# Patient Record
Sex: FEMALE | Race: BLACK OR AFRICAN AMERICAN | Employment: FULL TIME | URBAN - METROPOLITAN AREA
[De-identification: names, ages, dates, MRNs, and addresses within clinical notes are randomized per-mention and may not be internally consistent; named-entity substitution may affect disease eponyms.]

---

## 2019-03-31 ENCOUNTER — HOSPITAL ENCOUNTER (EMERGENCY)
Age: 29
Discharge: HOME | End: 2019-03-31
Attending: EMERGENCY MEDICINE
Payer: MEDICAID

## 2019-03-31 VITALS
RESPIRATION RATE: 14 BRPM | SYSTOLIC BLOOD PRESSURE: 124 MMHG | OXYGEN SATURATION: 100 % | WEIGHT: 293 LBS | HEART RATE: 81 BPM | DIASTOLIC BLOOD PRESSURE: 81 MMHG | HEIGHT: 67 IN | BODY MASS INDEX: 45.99 KG/M2 | TEMPERATURE: 97.6 F

## 2019-03-31 DIAGNOSIS — J02.0 STREP PHARYNGITIS: Primary | ICD-10-CM

## 2019-03-31 LAB — S PYO AG THROAT QL: POSITIVE

## 2019-03-31 PROCEDURE — 87880 STREP A ASSAY W/OPTIC: CPT

## 2019-03-31 PROCEDURE — 96372 THER/PROPH/DIAG INJ SC/IM: CPT

## 2019-03-31 PROCEDURE — 6360000002 HC RX W HCPCS: Performed by: EMERGENCY MEDICINE

## 2019-03-31 PROCEDURE — 99283 EMERGENCY DEPT VISIT LOW MDM: CPT

## 2019-03-31 RX ADMIN — PENICILLIN G BENZATHINE 1.2 MILLION UNITS: 1200000 INJECTION, SUSPENSION INTRAMUSCULAR at 10:50

## 2019-03-31 ASSESSMENT — PAIN DESCRIPTION - PAIN TYPE
TYPE: ACUTE PAIN

## 2019-03-31 ASSESSMENT — PAIN DESCRIPTION - DESCRIPTORS
DESCRIPTORS: ACHING

## 2019-03-31 ASSESSMENT — PAIN DESCRIPTION - LOCATION
LOCATION: THROAT
LOCATION: THROAT;EAR
LOCATION: THROAT;EAR

## 2019-03-31 ASSESSMENT — PAIN SCALES - GENERAL
PAINLEVEL_OUTOF10: 7

## 2019-03-31 ASSESSMENT — PAIN DESCRIPTION - ORIENTATION: ORIENTATION: LEFT;RIGHT

## 2019-03-31 NOTE — LETTER
[address] Emergency Department  [address] [address]  Phone: [phone]  Fax: [phone]               March 31, 2019    Patient: Carlos Paez   YOB: 1990   Date of Visit: 3/31/2019       To Whom It May Concern:    Adis Brito was seen and treated in our emergency department on 3/31/2019. She may return to work 4/2/2019.       Sincerely,               Signature:__________________________________

## 2019-03-31 NOTE — ED NOTES
Patient to ed with complaints of a sore throat and ear pain with started approx 1 week ago and continued.      Zbigniew Cisneros RN  03/31/19 1798

## 2019-03-31 NOTE — ED PROVIDER NOTES
CHIEF COMPLAINT  Pharyngitis      HISTORY OF PRESENT ILLNESS  Carlie Dumont is a 33 y.o. female, who presents to the ED with proximally one week history of upper respiratory symptoms with nasal congestion and right-sided earache right sided sore throat and right-sided neck pain moderate in severity patient has pain worse with swallowing no difficulty swallowing no shortness of breath no fever. Review of Systems    I have reviewed the following from the nursing documentation. History reviewed. No pertinent past medical history. History reviewed. No pertinent surgical history. History reviewed. No pertinent family history.   Social History     Socioeconomic History    Marital status:      Spouse name: Not on file    Number of children: Not on file    Years of education: Not on file    Highest education level: Not on file   Occupational History    Not on file   Social Needs    Financial resource strain: Not on file    Food insecurity:     Worry: Not on file     Inability: Not on file    Transportation needs:     Medical: Not on file     Non-medical: Not on file   Tobacco Use    Smoking status: Never Smoker    Smokeless tobacco: Never Used   Substance and Sexual Activity    Alcohol use: Not Currently    Drug use: Never    Sexual activity: Not on file   Lifestyle    Physical activity:     Days per week: Not on file     Minutes per session: Not on file    Stress: Not on file   Relationships    Social connections:     Talks on phone: Not on file     Gets together: Not on file     Attends [employer] service: Not on file     Active member of club or organization: Not on file     Attends meetings of clubs or organizations: Not on file     Relationship status: Not on file    Intimate partner violence:     Fear of current or ex partner: Not on file     Emotionally abused: Not on file     Physically abused: Not on file     Forced sexual activity: Not on file   Other Topics Concern    Not on file   Social History Narrative    Not on file     No current facility-administered medications for this encounter. No current outpatient medications on file. Allergies   Allergen Reactions    Fentanyl           PHYSICAL EXAM  /81   Pulse 81   Temp 97.6 °F (36.4 °C)   Resp 14   Ht 5' 7\" (1.702 m)   Wt (!) 201.1 kg (443 lb 7 oz)   LMP 03/31/2019   SpO2 100%   BMI 69.45 kg/m²   Physical Exam   GENERAL APPEARANCE: Awake and alert. Cooperative. In no acute distress. EYES: PERRL. Corneas clear. Sclera non icteric. No conjunctival injection  ENT: Oropharynx bilateral tonsillar hypertrophy and erythema no exudates Airway patent. No stridor. No asymmetry. No uvular deviation no palatal depression. Tympanic membranes are clear bilaterally  NECK: Supple mild right sided anterior cervical adenopathy  LUNGS: Clear. Equal breath sounds bilaterally. CARDIOVASCULAR: RRR. No murmurs rubs or gallops. EXTREMITIES:  Moves all extremities equally. SKIN: Warm and dry. LABORATORY STUDIES:   Labs Reviewed   STREP SCREEN GROUP A THROAT - Abnormal; Notable for the following components:       Result Value    Rapid Strep A Screen POSITIVE (*)     All other components within normal limits    Narrative:     Performed at:  [facility]  [address],  [address]   Phone ([phone]  No orders to display       If EKG done, EKG was interpreted independently by me    PROCEDURES  Procedures    EDCOURSE/MDM  Patient seen and evaluated. Old records selectively reviewed if pertinent. Labs and imaging reviewed and results discussed with patient. I considered strep pharyngitis. No evidence for peritonsillar abscess at this time. The patient prefers treatment with intramuscular injection of penicillin at this time.     If Kane Valentino is discharged, I discussed with Kane Valentino and/or family the exam results, diagnosis, care, prognosis, reasons to return and the importance of follow up. Patient and/or family is in agreement with plan and all questions have been answered. Specific discharge instructions explained, including reasons to return to the emergency department. If discharged, patient was given scripts for the following medications. New Prescriptions    No medications on file       CLINICAL IMPRESSION  1. Strep pharyngitis        /81   Pulse 81   Temp 97.6 °F (36.4 °C)   Resp 14   Ht 5' 7\" (1.702 m)   Wt (!) 201.1 kg (443 lb 7 oz)   LMP 03/31/2019   SpO2 100%   BMI 69.45 kg/m²     DISPOSITION  Travis Oliveira was discharged in stable condition.                    Lencho Dowd MD  03/31/19 6053

## 2019-03-31 NOTE — ED NOTES
Patient given work note, discharge instructions verbal and written, patient verbalized understanding. Alert/oriented X4, Clear speech.   Patient exhibits no distress, ambulates with steady gait per self leaving unit, no further request.     Penny Musa RN  03/31/19 5412

## 2020-08-25 ENCOUNTER — HOSPITAL ENCOUNTER (EMERGENCY)
Age: 30
Discharge: HOME | End: 2020-08-25
Attending: EMERGENCY MEDICINE
Payer: MEDICAID

## 2020-08-25 ENCOUNTER — APPOINTMENT (OUTPATIENT)
Dept: CT IMAGING | Age: 30
End: 2020-08-25
Payer: MEDICAID

## 2020-08-25 VITALS
HEIGHT: 67 IN | SYSTOLIC BLOOD PRESSURE: 155 MMHG | HEART RATE: 76 BPM | OXYGEN SATURATION: 99 % | DIASTOLIC BLOOD PRESSURE: 103 MMHG | WEIGHT: 293 LBS | BODY MASS INDEX: 45.99 KG/M2 | RESPIRATION RATE: 20 BRPM | TEMPERATURE: 98.8 F

## 2020-08-25 LAB
A/G RATIO: 0.9 (ref 1.1–2.2)
ALBUMIN SERPL-MCNC: 3.6 G/DL (ref 3.4–5)
ALP BLD-CCNC: 72 U/L (ref 40–129)
ALT SERPL-CCNC: 13 U/L (ref 10–40)
ANION GAP SERPL CALCULATED.3IONS-SCNC: 11 MMOL/L (ref 3–16)
ANISOCYTOSIS: ABNORMAL
AST SERPL-CCNC: 9 U/L (ref 15–37)
BACTERIA: ABNORMAL /HPF
BASOPHILS ABSOLUTE: 0.1 K/UL (ref 0–0.2)
BASOPHILS RELATIVE PERCENT: 0.9 %
BILIRUB SERPL-MCNC: <0.2 MG/DL (ref 0–1)
BILIRUBIN URINE: NEGATIVE
BLOOD, URINE: ABNORMAL
BUN BLDV-MCNC: 8 MG/DL (ref 7–20)
CALCIUM SERPL-MCNC: 9.1 MG/DL (ref 8.3–10.6)
CHLORIDE BLD-SCNC: 103 MMOL/L (ref 99–110)
CLARITY: CLEAR
CO2: 27 MMOL/L (ref 21–32)
COLOR: YELLOW
CREAT SERPL-MCNC: <0.5 MG/DL (ref 0.6–1.1)
EOSINOPHILS ABSOLUTE: 0 K/UL (ref 0–0.6)
EOSINOPHILS RELATIVE PERCENT: 0.7 %
EPITHELIAL CELLS, UA: ABNORMAL /HPF (ref 0–5)
GFR AFRICAN AMERICAN: >60
GFR NON-AFRICAN AMERICAN: >60
GLOBULIN: 3.8 G/DL
GLUCOSE BLD-MCNC: 140 MG/DL (ref 70–99)
GLUCOSE URINE: NEGATIVE MG/DL
HCG(URINE) PREGNANCY TEST: NEGATIVE
HCT VFR BLD CALC: 29.5 % (ref 36–48)
HEMATOLOGY PATH CONSULT: NORMAL
HEMATOLOGY PATH CONSULT: YES
HEMOGLOBIN: 8.5 G/DL (ref 12–16)
HYPOCHROMIA: ABNORMAL
KETONES, URINE: NEGATIVE MG/DL
LEUKOCYTE ESTERASE, URINE: NEGATIVE
LYMPHOCYTES ABSOLUTE: 2.1 K/UL (ref 1–5.1)
LYMPHOCYTES RELATIVE PERCENT: 30.5 %
MCH RBC QN AUTO: 18.6 PG (ref 26–34)
MCHC RBC AUTO-ENTMCNC: 28.8 G/DL (ref 31–36)
MCV RBC AUTO: 64.7 FL (ref 80–100)
MICROCYTES: ABNORMAL
MICROSCOPIC EXAMINATION: YES
MONOCYTES ABSOLUTE: 0.4 K/UL (ref 0–1.3)
MONOCYTES RELATIVE PERCENT: 6.4 %
NEUTROPHILS ABSOLUTE: 4.2 K/UL (ref 1.7–7.7)
NEUTROPHILS RELATIVE PERCENT: 61.5 %
NITRITE, URINE: NEGATIVE
PDW BLD-RTO: 20.6 % (ref 12.4–15.4)
PH UA: 6 (ref 5–8)
PLATELET # BLD: 448 K/UL (ref 135–450)
PMV BLD AUTO: 9.2 FL (ref 5–10.5)
POLYCHROMASIA: ABNORMAL
POTASSIUM REFLEX MAGNESIUM: 4.4 MMOL/L (ref 3.5–5.1)
PROTEIN UA: NEGATIVE MG/DL
RBC # BLD: 4.56 M/UL (ref 4–5.2)
RBC UA: ABNORMAL /HPF (ref 0–4)
SODIUM BLD-SCNC: 141 MMOL/L (ref 136–145)
SPECIFIC GRAVITY UA: 1.02 (ref 1–1.03)
TOTAL PROTEIN: 7.4 G/DL (ref 6.4–8.2)
URINE TYPE: ABNORMAL
UROBILINOGEN, URINE: 0.2 E.U./DL
WBC # BLD: 6.8 K/UL (ref 4–11)
WBC UA: ABNORMAL /HPF (ref 0–5)

## 2020-08-25 PROCEDURE — 36415 COLL VENOUS BLD VENIPUNCTURE: CPT

## 2020-08-25 PROCEDURE — 96374 THER/PROPH/DIAG INJ IV PUSH: CPT

## 2020-08-25 PROCEDURE — 84703 CHORIONIC GONADOTROPIN ASSAY: CPT

## 2020-08-25 PROCEDURE — 80053 COMPREHEN METABOLIC PANEL: CPT

## 2020-08-25 PROCEDURE — 6360000002 HC RX W HCPCS: Performed by: EMERGENCY MEDICINE

## 2020-08-25 PROCEDURE — 81001 URINALYSIS AUTO W/SCOPE: CPT

## 2020-08-25 PROCEDURE — 85025 COMPLETE CBC W/AUTO DIFF WBC: CPT

## 2020-08-25 PROCEDURE — 99284 EMERGENCY DEPT VISIT MOD MDM: CPT

## 2020-08-25 PROCEDURE — 74176 CT ABD & PELVIS W/O CONTRAST: CPT

## 2020-08-25 PROCEDURE — 2580000003 HC RX 258: Performed by: EMERGENCY MEDICINE

## 2020-08-25 RX ORDER — IBUPROFEN 600 MG/1
600 TABLET ORAL EVERY 6 HOURS PRN
Qty: 40 TABLET | Refills: 0 | Status: SHIPPED | OUTPATIENT
Start: 2020-08-25 | End: 2021-02-28 | Stop reason: ALTCHOICE

## 2020-08-25 RX ORDER — 0.9 % SODIUM CHLORIDE 0.9 %
1000 INTRAVENOUS SOLUTION INTRAVENOUS ONCE
Status: COMPLETED | OUTPATIENT
Start: 2020-08-25 | End: 2020-08-25

## 2020-08-25 RX ORDER — KETOROLAC TROMETHAMINE 30 MG/ML
30 INJECTION, SOLUTION INTRAMUSCULAR; INTRAVENOUS ONCE
Status: COMPLETED | OUTPATIENT
Start: 2020-08-25 | End: 2020-08-25

## 2020-08-25 RX ADMIN — KETOROLAC TROMETHAMINE 30 MG: 30 INJECTION, SOLUTION INTRAMUSCULAR at 10:00

## 2020-08-25 RX ADMIN — SODIUM CHLORIDE 1000 ML: 9 INJECTION, SOLUTION INTRAVENOUS at 09:59

## 2020-08-25 ASSESSMENT — PAIN DESCRIPTION - ORIENTATION: ORIENTATION: LOWER

## 2020-08-25 ASSESSMENT — PAIN DESCRIPTION - DESCRIPTORS: DESCRIPTORS: CRAMPING

## 2020-08-25 ASSESSMENT — PAIN SCALES - GENERAL: PAINLEVEL_OUTOF10: 8

## 2020-08-25 ASSESSMENT — PAIN DESCRIPTION - PAIN TYPE: TYPE: ACUTE PAIN

## 2020-08-25 ASSESSMENT — PAIN DESCRIPTION - LOCATION: LOCATION: ABDOMEN

## 2020-08-25 NOTE — ED NOTES
Patient given prescription,  discharge instructions verbal and written, patient verbalized understanding. Alert/oriented X4, Clear speech.   Patient exhibits no distress, ambulates with steady gait per self leaving unit, no further request.     Ward Mitchell RN  08/25/20 0513

## 2020-08-25 NOTE — ED PROVIDER NOTES
CHIEF COMPLAINT  Abdominal Pain (lower abd pain)      HISTORY OF PRESENT ILLNESS  Urban Dean is a 31 y.o. female with a history of reported ectopic pregnancy status post tubal ligation who presents to the ED complaining of abdominal pain. Patient states that over the last 2 days she has noted some lower abdominal cramping that she rates as an 8/10. Patient denies any fevers, chills, or sweats. Patient reports that this may reflect her normal menses but states that she has irregular. Patient denies fevers, chills, or sweats. No cough, congestion. No nausea, vomiting, or diarrhea. No urinary symptoms. No bowel complaints. Patient reports that her last period was only a few weeks ago and was within normal limits. .   No other complaints, modifying factors or associated symptoms. I have reviewed the following from the nursing documentation. History reviewed. No pertinent past medical history. Past Surgical History:   Procedure Laterality Date    TUBAL LIGATION       History reviewed. No pertinent family history.   Social History     Socioeconomic History    Marital status:      Spouse name: Not on file    Number of children: Not on file    Years of education: Not on file    Highest education level: Not on file   Occupational History    Not on file   Social Needs    Financial resource strain: Not on file    Food insecurity     Worry: Not on file     Inability: Not on file    Transportation needs     Medical: Not on file     Non-medical: Not on file   Tobacco Use    Smoking status: Never Smoker    Smokeless tobacco: Never Used   Substance and Sexual Activity    Alcohol use: Not Currently    Drug use: Never    Sexual activity: Not on file   Lifestyle    Physical activity     Days per week: Not on file     Minutes per session: Not on file    Stress: Not on file   Relationships    Social connections     Talks on phone: Not on file     Gets together: Not on file     Attends Reflex to MG   Result Value Ref Range    Sodium 141 136 - 145 mmol/L    Potassium reflex Magnesium 4.4 3.5 - 5.1 mmol/L    Chloride 103 99 - 110 mmol/L    CO2 27 21 - 32 mmol/L    Anion Gap 11 3 - 16    Glucose 140 (H) 70 - 99 mg/dL    BUN 8 7 - 20 mg/dL    CREATININE <0.5 (L) 0.6 - 1.1 mg/dL    GFR Non-African American >60 >60    GFR African American >60 >60    Calcium 9.1 8.3 - 10.6 mg/dL    Total Protein 7.4 6.4 - 8.2 g/dL    Alb 3.6 3.4 - 5.0 g/dL    Albumin/Globulin Ratio 0.9 (L) 1.1 - 2.2    Total Bilirubin <0.2 0.0 - 1.0 mg/dL    Alkaline Phosphatase 72 40 - 129 U/L    ALT 13 10 - 40 U/L    AST 9 (L) 15 - 37 U/L    Globulin 3.8 g/dL           RADIOLOGY  X-RAYS:  I have reviewed radiologic plain film image(s). ALL OTHER NON-PLAIN FILM IMAGES SUCH AS CT, ULTRASOUND AND MRI HAVE BEEN READ BY THE RADIOLOGIST. CT ABDOMEN PELVIS WO CONTRAST Additional Contrast? None   Final Result      No acute abdominopelvic abnormality. Rechecks: Physical assessment performed. 1023: Patient denies any pain whatsoever at this time. ED COURSE/MDM  Patient seen and evaluated. Old records reviewed. Labs and imaging reviewed and results discussed with patient. Patient was given Toradol in the ED with good symptomatic relief. Patient was reassessed as noted above . No acute pathology was noted and pt is safe for discharge home to follow up with PCP. Patient's labs and imaging appear stable. As patient was having pelvic pain, I discussed and recommended obtaining a pelvic ultrasound for any additional pain. Patient reports that she understands and does not wish to proceed with transfer/formal ultrasound at this time. . Plan of care discussed with patient and family. Patient and family in agreement with plan. Patient was given scripts for the following medications. I counseled patient how to take these medications.    New Prescriptions    IBUPROFEN (IBU) 600 MG TABLET    Take 1 tablet by mouth every 6 hours as needed for Pain       CLINICAL IMPRESSION  1. Pelvic pain in female    2. Microcytic anemia    3. Elevated glucose        Blood pressure (!) 155/103, pulse 76, temperature 98.8 °F (37.1 °C), temperature source Oral, resp. rate 20, height 5' 7\" (1.702 m), weight (!) 483 lb 4.8 oz (219.2 kg), SpO2 99 %. Clari Jenkins was discharged to home in stable condition.         Robyn Arreola DO  08/25/20 1041

## 2020-08-25 NOTE — ED TRIAGE NOTES
Patient to ed with complaints of lower abd pain which started 2 days ago which patient describes as a cramping feeling. Patient denies illness, no vomiting, diarrhea or constipation, patient unsure if she is starting a menstral cycle d/t her cycles are irregular.

## 2021-02-28 ENCOUNTER — HOSPITAL ENCOUNTER (EMERGENCY)
Age: 31
Discharge: HOME | End: 2021-02-28
Attending: EMERGENCY MEDICINE
Payer: MEDICAID

## 2021-02-28 VITALS
TEMPERATURE: 98.6 F | DIASTOLIC BLOOD PRESSURE: 78 MMHG | OXYGEN SATURATION: 98 % | HEART RATE: 86 BPM | BODY MASS INDEX: 72.38 KG/M2 | RESPIRATION RATE: 18 BRPM | WEIGHT: 293 LBS | SYSTOLIC BLOOD PRESSURE: 157 MMHG

## 2021-02-28 DIAGNOSIS — J06.9 ACUTE UPPER RESPIRATORY INFECTION: Primary | ICD-10-CM

## 2021-02-28 DIAGNOSIS — R10.9 ABDOMINAL PAIN, UNSPECIFIED ABDOMINAL LOCATION: ICD-10-CM

## 2021-02-28 LAB
BACTERIA: ABNORMAL /HPF
BILIRUBIN URINE: NEGATIVE
BLOOD, URINE: ABNORMAL
CLARITY: CLEAR
COLOR: YELLOW
EPITHELIAL CELLS, UA: ABNORMAL /HPF (ref 0–5)
GLUCOSE URINE: NEGATIVE MG/DL
HCG(URINE) PREGNANCY TEST: NEGATIVE
KETONES, URINE: NEGATIVE MG/DL
LEUKOCYTE ESTERASE, URINE: NEGATIVE
MICROSCOPIC EXAMINATION: YES
MUCUS: ABNORMAL /LPF
NITRITE, URINE: NEGATIVE
PH UA: 6 (ref 5–8)
PROTEIN UA: NEGATIVE MG/DL
RBC UA: ABNORMAL /HPF (ref 0–4)
SPECIFIC GRAVITY UA: 1.02 (ref 1–1.03)
URINE REFLEX TO CULTURE: ABNORMAL
URINE TYPE: ABNORMAL
UROBILINOGEN, URINE: 0.2 E.U./DL
WBC UA: ABNORMAL /HPF (ref 0–5)

## 2021-02-28 PROCEDURE — 81001 URINALYSIS AUTO W/SCOPE: CPT

## 2021-02-28 PROCEDURE — 84703 CHORIONIC GONADOTROPIN ASSAY: CPT

## 2021-02-28 PROCEDURE — 99283 EMERGENCY DEPT VISIT LOW MDM: CPT

## 2021-02-28 RX ORDER — FLUTICASONE PROPIONATE 50 MCG
1 SPRAY, SUSPENSION (ML) NASAL DAILY
Qty: 1 BOTTLE | Refills: 0 | Status: SHIPPED | OUTPATIENT
Start: 2021-02-28 | End: 2021-11-25 | Stop reason: ALTCHOICE

## 2021-02-28 RX ORDER — AMOXICILLIN 875 MG/1
875 TABLET, COATED ORAL 2 TIMES DAILY
Qty: 14 TABLET | Refills: 0 | Status: SHIPPED | OUTPATIENT
Start: 2021-02-28 | End: 2021-03-07

## 2021-02-28 RX ORDER — DICYCLOMINE HCL 20 MG
20 TABLET ORAL EVERY 6 HOURS PRN
Qty: 12 TABLET | Refills: 0 | Status: SHIPPED | OUTPATIENT
Start: 2021-02-28 | End: 2021-11-25 | Stop reason: ALTCHOICE

## 2021-02-28 NOTE — ED PROVIDER NOTES
[facility]  EMERGENCY DEPT VISIT      Patient Identification  Estella Stallworth is a 31 y.o. female. Chief Complaint   Abdominal Pain (Pt states that she is having lower abd pain that is a ache that comes and goes although she is pain free now) and URI (pt states that she has had respiratory issues since weather change and no has no sense of smell although can taste everything)      History of Present Illness: This is a  31 y.o. female who presents ambulatory  to the ED with complaints of 4 to 5-day history of off-and-on lower abdominal pain which goes all the way across the lower abdomen. It comes and goes last for 10 to 30 minutes at a time. She has no associated fever. She has had a little bit of nausea but no vomiting. No diarrhea. She has urinary frequency but no dysuria. She had a little bit of clear vaginal discharge but no odor. Her last menstrual cycle was normal.  She does not believe herself to be pregnant. She has no history of ovarian cyst.  Her abdominal pain goes up to a 5 at times but currently it is essentially gone. Her appetite is been unchanged. Patient is also complaining of a 1 week history of upper respiratory symptoms primarily sinus congestion with posterior nasal drip and pressure over her cheeks and nose. She has had a cough with mostly clear to somewhat cream-colored sputum. No fever. No headache or body aches. No chest pain or shortness of breath. She states that she gets similar symptoms whenever the weather changes. He was been taking Benadryl with some improvement. She has no history of asthma. No known contact with covid. Feels like her sense of smell is decreased but she can taste. History reviewed. No pertinent past medical history. Past Surgical History:   Procedure Laterality Date    TUBAL LIGATION         No current facility-administered medications for this encounter.      Current Outpatient Medications:     fluticasone (FLONASE) 50 MCG/ACT nasal spray, 1 spray by Each Nostril route daily, Disp: 1 Bottle, Rfl: 0    dicyclomine (BENTYL) 20 MG tablet, Take 1 tablet by mouth every 6 hours as needed (abdominal pain), Disp: 12 tablet, Rfl: 0    amoxicillin (AMOXIL) 875 MG tablet, Take 1 tablet by mouth 2 times daily for 7 days, Disp: 14 tablet, Rfl: 0    Allergies   Allergen Reactions    Fentanyl        Social History     Socioeconomic History    Marital status:      Spouse name: Not on file    Number of children: Not on file    Years of education: Not on file    Highest education level: Not on file   Occupational History    Not on file   Social Needs    Financial resource strain: Not on file    Food insecurity     Worry: Not on file     Inability: Not on file    Transportation needs     Medical: Not on file     Non-medical: Not on file   Tobacco Use    Smoking status: Never Smoker    Smokeless tobacco: Never Used   Substance and Sexual Activity    Alcohol use: Not Currently    Drug use: Never    Sexual activity: Not on file   Lifestyle    Physical activity     Days per week: Not on file     Minutes per session: Not on file    Stress: Not on file   Relationships    Social connections     Talks on phone: Not on file     Gets together: Not on file     Attends [employer] service: Not on file     Active member of club or organization: Not on file     Attends meetings of clubs or organizations: Not on file     Relationship status: Not on file    Intimate partner violence     Fear of current or ex partner: Not on file     Emotionally abused: Not on file     Physically abused: Not on file     Forced sexual activity: Not on file   Other Topics Concern    Not on file   Social History Narrative    Not on file       Nursing Notes Reviewed      ROS:  GENERAL:  No fever, no chills, no diaphoresis, no appetite changes  EYES: no eye discharge, no eye redness, no visual changes  ENT: + nasal congestion, no sore throat  CARDIAC: no chest pain, no palpitations, no leg swelling  PULM: + cough, no shortness of breath  ABD: + abdominal pain, + nausea, no vomiting, no diarrhea  : no dysuria, no hematuria, no urgency, no frequency. No flank pain  MUSCULOSKELETAL: no back pain, no arthralgias, no myalgias  NEURO: no headache, no lightheadedness, no dizziness, no numbness, no weakness, no syncope  SKIN: no rashes, no erythema, no wounds, no ecchymosis      PHYSICAL EXAM:  GENERAL APPEARANCE: Dori Fam is in no acute respiratory distress. Awake and alert. VITAL SIGNS:   ED Triage Vitals [02/28/21 1507]   Enc Vitals Group      BP (!) 157/78      Pulse 86      Resp 18      Temp 98.6 °F (37 °C)      Temp Source Oral      SpO2 98 %      Weight (!) 462 lb 1.6 oz (209.6 kg)      Height       Head Circumference       Peak Flow       Pain Score       Pain Loc       Pain Edu? Excl. in [address]? HEAD: Normocephalic, atraumatic. EYES:  Extraocular muscles are intact. Pupils equal round and reactive to light. Conjunctivas are pink. Negative scleral icterus. ENT:  Mucous membranes are moist.  Pharynx without erythema or exudates. TMs with no erythema  NECK: Nontender and supple. No cervical adenopathy. CHEST:  Clear to auscultation bilaterally. No rales, rhonchi, or wheezing. HEART:  Regular rate and regular rhythm. No murmurs. Strong and equal pulses in the upper and lower extremities. ABDOMEN: Soft,  nondistended, positive bowel sounds. abdomen is nontender. No rebound. no guarding. MUSCULOSKELETAL: The calves are nontender to palpation. Active range of motion of the upper and lower extremities. No edema. NEUROLOGICAL: Awake, alert and oriented x 3. Power intact in the upper and lower extremities. Sensation is intact to light touch in the upper and lower extremities. Cranial Nerves 2-12 are intact. DERMATOLOGIC: No petechiae, rashes, or ecchymoses. No erythema. PSYCH: normal mood and affect. Normal thought content.       ED COURSE AND MEDICAL DECISION MAKING:      Radiology:  Films have been read by radiologist as noted in chart unless otherwise stated. Other radiologic studies (i.e. CT, MRI, ultrasounds, etc ) have been interpreted by radiologist.     No orders to display       Labs:  Results for orders placed or performed during the hospital encounter of 02/28/21   Urinalysis Reflex to Culture    Specimen: Urine, clean catch   Result Value Ref Range    Color, UA Yellow Straw/Yellow    Clarity, UA Clear Clear    Glucose, Ur Negative Negative mg/dL    Bilirubin Urine Negative Negative    Ketones, Urine Negative Negative mg/dL    Specific Gravity, UA 1.025 1.005 - 1.030    Blood, Urine TRACE-INTACT (A) Negative    pH, UA 6.0 5.0 - 8.0    Protein, UA Negative Negative mg/dL    Urobilinogen, Urine 0.2 <2.0 E.U./dL    Nitrite, Urine Negative Negative    Leukocyte Esterase, Urine Negative Negative    Microscopic Examination YES     Urine Type NotGiven     Urine Reflex to Culture Not Indicated    Pregnancy, Urine   Result Value Ref Range    HCG(Urine) Pregnancy Test Negative Detects HCG level >20 MIU/mL   Microscopic Urinalysis   Result Value Ref Range    Mucus, UA Rare (A) None Seen /LPF    WBC, UA 0-2 0 - 5 /HPF    RBC, UA 0-2 0 - 4 /HPF    Epithelial Cells, UA 0-1 0 - 5 /HPF    Bacteria, UA Rare (A) None Seen /HPF       Treatment in the department:  Patient received the following while in the ED. Medications - No data to display      Medical decision making:  Patient with URI symptoms without fever, headache, chest pain or shortness of breath. No bronchospasm. No hypoxia. I do not feel CXR imaging is necessary. Recommended covid testing but patient refused. Has intermittent abdominal pain but is currently NOT tender on exam. No pain now. Given lack of localized pain for 4-5 days with nontender abdomen now, doubt appendicitis or diverticulitis. Not pregnant. No UTI. Asked patient to monitor symptoms and if pain persists or worsen to return.      I estimate

## 2021-02-28 NOTE — ED NOTES
Pt states that she has been having lower abd pain/acheness for the past few days and at this time is having no pain and then pt states that she also has been having URI symptoms with the weather change and states that she can taste her food and drinks although cannot smell at this time.       Caden Duarte RN  02/28/21 9680

## 2021-02-28 NOTE — ED NOTES
Attempted to do covid swab and pt does not thing she can tolerate it and wants to speak with MD. Will make MD aware.      Coty Cristobal RN  02/28/21 1600

## 2021-02-28 NOTE — ED NOTES
MD has been to bedside to talk with pt and pt still not willing to have swab.       Nabila Hart RN  02/28/21 9642

## 2021-03-01 ENCOUNTER — CARE COORDINATION (OUTPATIENT)
Dept: CARE COORDINATION | Age: 31
End: 2021-03-01

## 2021-03-01 NOTE — CARE COORDINATION
Patient contacted regarding recent visit for viral symptoms. This Finesse Alexander contacted the patient by telephone to perform post discharge call. Verified name and  with patient as identifiers. Provided introduction to self, and reason for call due to viral symptoms of infection and/or exposure to COVID-19. Call within 2 business days of discharge: Yes     Spoke to pt who reports that she is feeling lightly better today. Pt reports that she has filled RX's and has been taking. Pt reports that PCP is Isaias Mendoza NP and plans to follow up with office. Provided pt with [employer] resource. Pt reports that [employer] is not active to view COVID results, but was given instructions for activation. Pt declined Loop enrollment, but was agreeable to follow up call next week for symptom recheck. Pt thanked author for calling to follow up. Patient presented to emergency department/flu clinic with complaints of viral symptoms/exposure to COVID. Patient reports symptoms are the same. Due to no new or worsening symptoms the RN CTN/ACM was not notified for escalation. Discussed exposure protocols and quarantine with CDC Guidelines What To Do If You Are Sick    Patient was given an opportunity for questions and concerns. Stay home except to get medical care    Separate yourself from other people and animals in your home    Call ahead before visiting your doctor    Wear a facemask    Cover your coughs and sneezes    Clean your hands often    Avoid sharing personal household items    Clean all high-touch surfaces everyday    Monitor your symptoms  Seek prompt medical attention if your illness is worsening (e.g., difficulty breathing). Before seeking care, call your healthcare provider and tell them that you have, or are being evaluated for, COVID-19. Put on a facemask before you enter the facility.  These steps will help the healthcare provider's office to keep other people in the office or waiting room from getting infected or exposed. Ask your healthcare provider to call the local or [facility] health department. Persons who are placed under If you have a medical emergency and need to call 911, notify the dispatch personnel that you have, or are being evaluated for COVID-19. If possible, put on a facemask before emergency medical services arrive. The patient agrees to contact the [facility] exposure line [phone], local health department [place] [facility]: ([phone]) and PCP office for questions related to their healthcare. Author provided contact information for future reference. Patient/family/caregiver given information for [employer] and agrees to enroll no, declined  Patient's preferred e-mail:    Patient's preferred phone number:   Based on Loop alert triggers, patient will be contacted by nurse care manager for worsening symptoms.

## 2021-03-08 ENCOUNTER — CARE COORDINATION (OUTPATIENT)
Dept: CARE COORDINATION | Age: 31
End: 2021-03-08

## 2021-03-08 NOTE — CARE COORDINATION
Patient contacted regarding COVID-19 risk and screening.  contacted the patient by telephone to perform follow-up call. Verified name and  with patient as identifiers. Symptoms reviewed with patient. Patient reports symptoms are improving. Due to no new or worsening symptoms the RN CTN/ACM was not notified for escalation. Spoke to pt who reports that she is feeling \"better. \" Pt reports that she has finished up abx. Pt reports that she has followed up with PCP, Nella Wang. Pt declined follow up call next week for symptom recheck, but did thank author for calling to follow back up. This author reviewed discharge instructions, medical action plan and red flags such as increased shortness of breath, increasing fever, worsening cough or chest pain with patient who verbalized understanding. Discussed exposure protocols and quarantine with CDC Guidelines What To Do If You Are Sick    Patient who was given an opportunity for questions and concerns. The patient agrees to contact the [facility] exposure line [phone], local health department [place] [facility]: ([phone])[facility] PCP office for questions related to their healthcare. Author provided contact information for future reference.

## 2021-06-15 ENCOUNTER — CLINICAL DOCUMENTATION (OUTPATIENT)
Dept: OTHER | Age: 31
End: 2021-06-15

## 2021-11-25 ENCOUNTER — APPOINTMENT (OUTPATIENT)
Dept: CT IMAGING | Age: 31
End: 2021-11-25
Payer: MEDICAID

## 2021-11-25 ENCOUNTER — HOSPITAL ENCOUNTER (EMERGENCY)
Age: 31
Discharge: HOME | End: 2021-11-25
Attending: EMERGENCY MEDICINE
Payer: MEDICAID

## 2021-11-25 VITALS
OXYGEN SATURATION: 99 % | RESPIRATION RATE: 20 BRPM | BODY MASS INDEX: 45.99 KG/M2 | HEIGHT: 67 IN | TEMPERATURE: 97.5 F | HEART RATE: 78 BPM | WEIGHT: 293 LBS | SYSTOLIC BLOOD PRESSURE: 154 MMHG | DIASTOLIC BLOOD PRESSURE: 89 MMHG

## 2021-11-25 DIAGNOSIS — R10.11 POSTOPERATIVE RIGHT UPPER QUADRANT ABDOMINAL PAIN: Primary | ICD-10-CM

## 2021-11-25 DIAGNOSIS — G89.18 POSTOPERATIVE RIGHT UPPER QUADRANT ABDOMINAL PAIN: Primary | ICD-10-CM

## 2021-11-25 LAB
A/G RATIO: 1 (ref 1.1–2.2)
ALBUMIN SERPL-MCNC: 3.8 G/DL (ref 3.4–5)
ALP BLD-CCNC: 59 U/L (ref 40–129)
ALT SERPL-CCNC: 45 U/L (ref 10–40)
ANION GAP SERPL CALCULATED.3IONS-SCNC: 11 MMOL/L (ref 3–16)
AST SERPL-CCNC: 23 U/L (ref 15–37)
BACTERIA: ABNORMAL /HPF
BASOPHILS ABSOLUTE: 0 K/UL (ref 0–0.2)
BASOPHILS RELATIVE PERCENT: 0.7 %
BILIRUB SERPL-MCNC: 0.4 MG/DL (ref 0–1)
BILIRUBIN URINE: NEGATIVE
BLOOD, URINE: ABNORMAL
BUN BLDV-MCNC: 5 MG/DL (ref 7–20)
CALCIUM SERPL-MCNC: 9.7 MG/DL (ref 8.3–10.6)
CHLORIDE BLD-SCNC: 99 MMOL/L (ref 99–110)
CLARITY: ABNORMAL
CO2: 25 MMOL/L (ref 21–32)
COLOR: YELLOW
CREAT SERPL-MCNC: 0.7 MG/DL (ref 0.6–1.1)
D DIMER: 407 NG/ML DDU (ref 0–229)
EOSINOPHILS ABSOLUTE: 0.1 K/UL (ref 0–0.6)
EOSINOPHILS RELATIVE PERCENT: 1.5 %
EPITHELIAL CELLS, UA: ABNORMAL /HPF (ref 0–5)
GFR AFRICAN AMERICAN: >60
GFR NON-AFRICAN AMERICAN: >60
GLUCOSE BLD-MCNC: 85 MG/DL (ref 70–99)
GLUCOSE URINE: NEGATIVE MG/DL
HCG QUALITATIVE: NEGATIVE
HCT VFR BLD CALC: 34.5 % (ref 36–48)
HEMOGLOBIN: 10.8 G/DL (ref 12–16)
KETONES, URINE: >=80 MG/DL
LEUKOCYTE ESTERASE, URINE: ABNORMAL
LIPASE: 56 U/L (ref 13–60)
LYMPHOCYTES ABSOLUTE: 1.6 K/UL (ref 1–5.1)
LYMPHOCYTES RELATIVE PERCENT: 26.2 %
MCH RBC QN AUTO: 22.4 PG (ref 26–34)
MCHC RBC AUTO-ENTMCNC: 31.2 G/DL (ref 31–36)
MCV RBC AUTO: 71.9 FL (ref 80–100)
MICROSCOPIC EXAMINATION: YES
MONOCYTES ABSOLUTE: 0.6 K/UL (ref 0–1.3)
MONOCYTES RELATIVE PERCENT: 8.8 %
NEUTROPHILS ABSOLUTE: 3.9 K/UL (ref 1.7–7.7)
NEUTROPHILS RELATIVE PERCENT: 62.8 %
NITRITE, URINE: POSITIVE
PDW BLD-RTO: 19.5 % (ref 12.4–15.4)
PH UA: 5 (ref 5–8)
PLATELET # BLD: 381 K/UL (ref 135–450)
PMV BLD AUTO: 9.1 FL (ref 5–10.5)
POTASSIUM REFLEX MAGNESIUM: 3.7 MMOL/L (ref 3.5–5.1)
PROTEIN UA: NEGATIVE MG/DL
RBC # BLD: 4.8 M/UL (ref 4–5.2)
RBC UA: ABNORMAL /HPF (ref 0–4)
SODIUM BLD-SCNC: 135 MMOL/L (ref 136–145)
SPECIFIC GRAVITY UA: <=1.005 (ref 1–1.03)
TOTAL PROTEIN: 7.5 G/DL (ref 6.4–8.2)
URINE TYPE: ABNORMAL
UROBILINOGEN, URINE: 0.2 E.U./DL
WBC # BLD: 6.2 K/UL (ref 4–11)
WBC UA: ABNORMAL /HPF (ref 0–5)

## 2021-11-25 PROCEDURE — 96375 TX/PRO/DX INJ NEW DRUG ADDON: CPT

## 2021-11-25 PROCEDURE — 71260 CT THORAX DX C+: CPT

## 2021-11-25 PROCEDURE — 85025 COMPLETE CBC W/AUTO DIFF WBC: CPT

## 2021-11-25 PROCEDURE — 83690 ASSAY OF LIPASE: CPT

## 2021-11-25 PROCEDURE — 96361 HYDRATE IV INFUSION ADD-ON: CPT

## 2021-11-25 PROCEDURE — 84703 CHORIONIC GONADOTROPIN ASSAY: CPT

## 2021-11-25 PROCEDURE — 85379 FIBRIN DEGRADATION QUANT: CPT

## 2021-11-25 PROCEDURE — 6360000004 HC RX CONTRAST MEDICATION: Performed by: EMERGENCY MEDICINE

## 2021-11-25 PROCEDURE — 74177 CT ABD & PELVIS W/CONTRAST: CPT

## 2021-11-25 PROCEDURE — 81001 URINALYSIS AUTO W/SCOPE: CPT

## 2021-11-25 PROCEDURE — 6360000002 HC RX W HCPCS: Performed by: EMERGENCY MEDICINE

## 2021-11-25 PROCEDURE — 80053 COMPREHEN METABOLIC PANEL: CPT

## 2021-11-25 PROCEDURE — 2580000003 HC RX 258: Performed by: EMERGENCY MEDICINE

## 2021-11-25 PROCEDURE — 99283 EMERGENCY DEPT VISIT LOW MDM: CPT

## 2021-11-25 PROCEDURE — 96374 THER/PROPH/DIAG INJ IV PUSH: CPT

## 2021-11-25 PROCEDURE — 6370000000 HC RX 637 (ALT 250 FOR IP): Performed by: EMERGENCY MEDICINE

## 2021-11-25 RX ORDER — ACETAMINOPHEN 500 MG
1000 TABLET ORAL EVERY 6 HOURS PRN
COMMUNITY

## 2021-11-25 RX ORDER — ONDANSETRON 2 MG/ML
4 INJECTION INTRAMUSCULAR; INTRAVENOUS ONCE
Status: COMPLETED | OUTPATIENT
Start: 2021-11-25 | End: 2021-11-25

## 2021-11-25 RX ORDER — TRAMADOL HYDROCHLORIDE 50 MG/1
50 TABLET ORAL EVERY 6 HOURS PRN
COMMUNITY

## 2021-11-25 RX ORDER — PANTOPRAZOLE SODIUM 40 MG/1
40 GRANULE, DELAYED RELEASE ORAL
COMMUNITY

## 2021-11-25 RX ORDER — OXYCODONE HYDROCHLORIDE AND ACETAMINOPHEN 5; 325 MG/1; MG/1
1 TABLET ORAL EVERY 6 HOURS PRN
Qty: 10 TABLET | Refills: 0 | Status: SHIPPED | OUTPATIENT
Start: 2021-11-25 | End: 2021-11-28

## 2021-11-25 RX ORDER — 0.9 % SODIUM CHLORIDE 0.9 %
1000 INTRAVENOUS SOLUTION INTRAVENOUS ONCE
Status: COMPLETED | OUTPATIENT
Start: 2021-11-25 | End: 2021-11-25

## 2021-11-25 RX ORDER — ONDANSETRON 4 MG/1
4 TABLET, FILM COATED ORAL EVERY 8 HOURS PRN
COMMUNITY

## 2021-11-25 RX ORDER — OXYCODONE HYDROCHLORIDE AND ACETAMINOPHEN 5; 325 MG/1; MG/1
1 TABLET ORAL ONCE
Status: COMPLETED | OUTPATIENT
Start: 2021-11-25 | End: 2021-11-25

## 2021-11-25 RX ORDER — KETOROLAC TROMETHAMINE 30 MG/ML
30 INJECTION, SOLUTION INTRAMUSCULAR; INTRAVENOUS ONCE
Status: COMPLETED | OUTPATIENT
Start: 2021-11-25 | End: 2021-11-25

## 2021-11-25 RX ADMIN — OXYCODONE AND ACETAMINOPHEN 1 TABLET: 5; 325 TABLET ORAL at 19:47

## 2021-11-25 RX ADMIN — ONDANSETRON 4 MG: 2 INJECTION INTRAMUSCULAR; INTRAVENOUS at 18:05

## 2021-11-25 RX ADMIN — SODIUM CHLORIDE 1000 ML: 9 INJECTION, SOLUTION INTRAVENOUS at 19:24

## 2021-11-25 RX ADMIN — KETOROLAC TROMETHAMINE 30 MG: 30 INJECTION, SOLUTION INTRAMUSCULAR at 18:05

## 2021-11-25 RX ADMIN — IOHEXOL 50 ML: 240 INJECTION, SOLUTION INTRATHECAL; INTRAVASCULAR; INTRAVENOUS; ORAL at 18:04

## 2021-11-25 ASSESSMENT — PAIN SCALES - GENERAL
PAINLEVEL_OUTOF10: 10
PAINLEVEL_OUTOF10: 4
PAINLEVEL_OUTOF10: 7
PAINLEVEL_OUTOF10: 10

## 2021-11-25 ASSESSMENT — PAIN DESCRIPTION - PAIN TYPE: TYPE: ACUTE PAIN

## 2021-11-25 ASSESSMENT — PAIN DESCRIPTION - LOCATION: LOCATION: ABDOMEN

## 2021-11-25 ASSESSMENT — PAIN DESCRIPTION - ORIENTATION: ORIENTATION: RIGHT

## 2021-11-25 ASSESSMENT — PAIN DESCRIPTION - DESCRIPTORS: DESCRIPTORS: CONSTANT

## 2021-11-25 NOTE — ED PROVIDER NOTES
PHYSICIAN IN TRIAGE NOTE    I performed a medical screening examination and evaluated this patient briefly with the purpose of initiating their ED work-up in an expeditious manner. Please see notes from other ED providers regarding comprehensive evaluation including full history, physical exam, interpretation of results and medical decision making/disposition. In brief, Akua Holcomb is a 27 y.o. female with history of hypertension and morbid obesity, status post laparoscopic sleeve gastrectomy on  at [address] [facility] by Dr. Douglas Plata bariatric surgeon,  who presents to the ED complaining of waking up this morning with new right upper abdominal pain. She states the tramadol she was given for pain is not helping. The pain is somewhat pleuritic. Denies any mid chest pain or shortness of breath. Prior to today she states the pain was milder and more in the upper mid abdomen. She denies cough. No fever or chills. Denies any vomiting or diarrhea. She is tolerating her liquid bariatric diet. Patient has had previous  and tubal ligation. She states she called the surgeon at [facility] [facility] but did not receive a call back immediately so she came here. Focused physical examination notable for no acute distress, well-appearing, well-nourished, normal speech and mentation. Not pale or diaphoretic. No respiratory distress. Lungs are clear. Heart exam is regular rate and rhythm. Abdominal exam shows intact laparoscopy incisions with skin adhesive in place. Bowel sounds are present but decreased. She is tender in the right upper quadrant with no left-sided or lower abdominal tenderness. Vital signs reviewed per nursing documentation. Triage orders placed, including saline lock, urinalysis and blood work. Zofran 4 mg IV, Toradol 30 mg IV and Dilaudid 0.5 mg IV were ordered. WBC was normal with hemoglobin 10.8. CMP was normal with the exception of a sodium of 135.   Liver function tests and lipase were normal.  hCG was negative. D-dimer was slightly elevated at 407. Plan CTA chest with contrast to rule out PE and CT scan of the abdomen pelvis with IV and oral contrast to rule out sleeve gastrectomy leak versus torsion    I did not personally review any of the results of these tests, which will be reviewed and interpreted later by ED providers at the time of the patient's more comprehensive evaluation.           Darlene Rausch MD  11/25/21 Lor Juárez MD  11/25/21 [id number]

## 2021-11-26 NOTE — ED NOTES
Patient prepared for and ready to be discharged. Patient discharged at this time in no acute distress after verbalizing understanding of discharge instructions. Patient left after receiving After Visit Summary instructions.         Maricarmen Acosta RN  11/25/21 2029

## 2021-11-26 NOTE — ED PROVIDER NOTES
[facility]  EMERGENCY DEPT VISIT      Patient Identification  Deo Paecock is a 27 y.o. female. Chief Complaint   Abdominal Pain (pt has gastric sleeve done last week and woke up this morning with right sided abd pain that gets worse with movement and pain medication that she is prescribed does not help)      History of Present Illness: This is a  27 y.o. female who presents ambulatory  to the ED with complaints of right upper quadrant abdominal pain. Patient underwent laparoscopic sleeve gastrectomy on  at [facility]. She states that she had been doing well postoperatively and not requiring too much pain medication. Her pain postoperatively was more central.  She has been tolerating her liquid diet and having bowel movements. No fever. This morning she woke up and started having sharp stabbing right upper quadrant pain. It does not radiate through to the back. It is worse in the upright position when she stands or sits upright or moves. It is worse with deep breaths if she is sitting upright. She denies shortness of breath. No fever. No sinus congestion or cough. No leg pain or swelling. No history of DVT or PE in the past.  She tried taking her tramadol but it did not help with the pain. History reviewed. No pertinent past medical history.     Past Surgical History:   Procedure Laterality Date     SECTION      x2    GASTRIC BYPASS SURGERY      sleeve 21    TUBAL LIGATION           Current Facility-Administered Medications:     iopamidol (ISOVUE-370) 76 % injection 80 mL, 80 mL, IntraVENous, ONCE PRN, Jeramy Garrett MD    HYDROmorphone (DILAUDID) injection 0.5 mg, 0.5 mg, IntraVENous, Once PRN, Jeramy Garrett MD    0.9 % sodium chloride bolus, 1,000 mL, IntraVENous, Once, Jeramy Garrett MD, Last Rate: 500 mL/hr at 21, 1,000 mL at 21    Current Outpatient Medications:     traMADol (ULTRAM) 50 MG tablet, Take 50 mg by mouth every 6 hours as needed for Pain., Disp: , Rfl:     acetaminophen (TYLENOL) 500 MG tablet, Take 1,000 mg by mouth every 6 hours as needed for Pain, Disp: , Rfl:     ondansetron (ZOFRAN) 4 MG tablet, Take 4 mg by mouth every 8 hours as needed for Nausea or Vomiting, Disp: , Rfl:     pantoprazole sodium (PROTONIX) 40 MG PACK packet, Take 40 mg by mouth every morning (before breakfast), Disp: , Rfl:     Docusate Calcium (STOOL SOFTENER PO), Take by mouth, Disp: , Rfl:     oxyCODONE-acetaminophen (PERCOCET) 5-325 MG per tablet, Take 1 tablet by mouth every 6 hours as needed for Pain for up to 3 days. Intended supply: 3 days. Take lowest dose possible to manage pain, Disp: 10 tablet, Rfl: 0    Allergies   Allergen Reactions    Fentanyl        Social History     Socioeconomic History    Marital status:      Spouse name: Not on file    Number of children: Not on file    Years of education: Not on file    Highest education level: Not on file   Occupational History    Not on file   Tobacco Use    Smoking status: Never Smoker    Smokeless tobacco: Never Used   Substance and Sexual Activity    Alcohol use: Not Currently    Drug use: Never    Sexual activity: Not on file   Other Topics Concern    Not on file   Social History Narrative    Not on file     Social Determinants of Health     Financial Resource Strain:     Difficulty of Paying Living Expenses: Not on file   Food Insecurity:     Worried About Running Out of Food in the Last Year: Not on file    Keri of Food in the Last Year: Not on file   Transportation Needs:     Lack of Transportation (Medical): Not on file    Lack of Transportation (Non-Medical):  Not on file   Physical Activity:     Days of Exercise per Week: Not on file    Minutes of Exercise per Session: Not on file   Stress:     Feeling of Stress : Not on file   Social Connections:     Frequency of Communication with Friends and Family: Not on file    Frequency of Social Gatherings with Friends and Family: Not on file    Attends Alevism Services: Not on file    Active Member of Clubs or Organizations: Not on file    Attends Club or Organization Meetings: Not on file    Marital Status: Not on file   Intimate Partner Violence:     Fear of Current or Ex-Partner: Not on file    Emotionally Abused: Not on file    Physically Abused: Not on file    Sexually Abused: Not on file   Housing Stability:     Unable to Pay for Housing in the Last Year: Not on file    Number of [place] in the Last Year: Not on file    Unstable Housing in the Last Year: Not on file       Nursing Notes Reviewed      ROS:  GENERAL:  No fever, no chills, no diaphoresis, no appetite changes  EYES: no eye discharge, no eye redness, no visual changes  ENT: no nasal congestion, no sore throat  CARDIAC: no chest pain,  no leg swelling  PULM: no cough, no shortness of breath  ABD: + abdominal pain, no nausea, no vomiting, no diarrhea, no melena or hematochezia  : no dysuria, no hematuria, no urgency, no frequency. No flank pain  MUSCULOSKELETAL: no back pain, no arthralgias, no myalgias  NEURO: no headache, no lightheadedness, no dizziness, no numbness, no weakness, no syncope  SKIN: no rashes, no erythema, no wounds, no ecchymosis      PHYSICAL EXAM:  GENERAL APPEARANCE: Kenneth Chopra is in no acute respiratory distress. Awake and alert. VITAL SIGNS:   ED Triage Vitals [11/25/21 1725]   Enc Vitals Group      BP (!) 154/89      Pulse 78      Resp 20      Temp 97.5 °F (36.4 °C)      Temp Source Oral      SpO2 99 %      Weight (!) 440 lb (199.6 kg)      Height 5' 7\" (1.702 m)      Head Circumference       Peak Flow       Pain Score       Pain Loc       Pain Edu? Excl. in [address]? HEAD: Normocephalic, atraumatic. EYES:  Extraocular muscles are intact. Pupils equal round and reactive to light. Conjunctivas are pink. Negative scleral icterus. ENT:  Mucous membranes are moist.  Pharynx without erythema or exudates.    NECK: Nontender and supple. No cervical adenopathy. CHEST:  Clear to auscultation bilaterally. No rales, rhonchi, or wheezing. HEART:  Regular rate and regular rhythm. No murmurs. Strong and equal pulses in the upper and lower extremities. ABDOMEN: Soft,  nondistended, positive bowel sounds. abdomen is tender in RUQ. negaitve prado sign. No rebound. no guarding. Laparoscopic incision sites healing well. No dehiscence or erythema or drainage  MUSCULOSKELETAL: The calves are nontender to palpation. Active range of motion of the upper and lower extremities. No edema. NEUROLOGICAL: Awake, alert and oriented x 3. Power intact in the upper and lower extremities. DERMATOLOGIC: No petechiae, rashes, or ecchymoses. No erythema. PSYCH: normal mood and affect. Normal thought content. ED COURSE AND MEDICAL DECISION MAKING:      Radiology:  Films have been read by radiologist as noted in chart unless otherwise stated. Other radiologic studies (i.e. CT, MRI, ultrasounds, etc ) have been interpreted by radiologist.     [address], [address]   Final Result      1. No CT findings of pulmonary embolus in the main pulmonary arteries or segmental pulmonary artery branches. Distal branches are difficult to evaluate due to suboptimal contrast opacification and patient motion. 2.  No localized consolidation or pleural effusion. CT ABDOMEN AND PELVIS WITH CONTRAST      Comparison study 8/25/2020      HISTORY: Abdominal pain      FINDINGS: Thin section axial images obtained through the abdomen and pelvis following the administration of 80 mL Isovue-370. Oral contrast was administered. Multiplanar reconstruction images received for interpretation. Low radiation dose CT    technique utilized. The liver, spleen, pancreas, gallbladder, and adrenal glands unremarkable. Bilateral kidneys demonstrate uniform enhancement. No findings for obstructive uropathy.       Tiny gas bubble in the right subdiaphragmatic space likely related to recent surgery. There is a previous site gastrectomy. No extravasation of contrast fluid collection seen. Bowel loops are partially opacified without findings for obstruction. No focal bowel wall thickening or mass seen. Appendix is visualized and appears within normal limits. Within the pelvis, bladder mildly distended and otherwise unremarkable. Uterus and adnexal structures are intact. No pelvic lymphadenopathy or free fluid. IMPRESSION:      1. No findings for acute intra-abdominal pelvic abnormality to explain patient's symptoms. 2.  Single tiny small gas bubble in the right subdiaphragmatic space may relate to patient's recent gastric surgery. Correlate clinically. 3.  Nonspecific bowel gas pattern. CT ABDOMEN PELVIS W IV CONTRAST Additional Contrast? Oral   Final Result      1. No CT findings of pulmonary embolus in the main pulmonary arteries or segmental pulmonary artery branches. Distal branches are difficult to evaluate due to suboptimal contrast opacification and patient motion. 2.  No localized consolidation or pleural effusion. CT ABDOMEN AND PELVIS WITH CONTRAST      Comparison study 8/25/2020      HISTORY: Abdominal pain      FINDINGS: Thin section axial images obtained through the abdomen and pelvis following the administration of 80 mL Isovue-370. Oral contrast was administered. Multiplanar reconstruction images received for interpretation. Low radiation dose CT    technique utilized. The liver, spleen, pancreas, gallbladder, and adrenal glands unremarkable. Bilateral kidneys demonstrate uniform enhancement. No findings for obstructive uropathy. Tiny gas bubble in the right subdiaphragmatic space likely related to recent surgery. There is a previous site gastrectomy. No extravasation of contrast fluid collection seen.       Bowel loops are partially opacified without findings for obstruction. No focal bowel wall thickening or mass seen. Appendix is visualized and appears within normal limits. Within the pelvis, bladder mildly distended and otherwise unremarkable. Uterus and adnexal structures are intact. No pelvic lymphadenopathy or free fluid. IMPRESSION:      1. No findings for acute intra-abdominal pelvic abnormality to explain patient's symptoms. 2.  Single tiny small gas bubble in the right subdiaphragmatic space may relate to patient's recent gastric surgery. Correlate clinically. 3.  Nonspecific bowel gas pattern. CT ABDOMEN PELVIS W IV CONTRAST Additional Contrast? Oral    Result Date: 11/25/2021  EXAM: CT ANGIOGRAPHY CHEST WITH CONTRAST (PULMONARY EMBOLUS PROTOCOL) INDICATION: Pleuritic right lower chest/right upper abdominal pain; postop sleeve gastrectomy; elevated D-dimer, COMPARISON: None. TECHNIQUE: Axial CT imaging obtained through the chest using CT pulmonary angiogram protocol. Axial images, multiplanar reformatted images and maximum intensity projection images were reviewed for CT angiographic technique. Individualized dose optimization technique was used in order to meet ALARA standards for radiation dose reduction. In addition to vendor specific dose reduction algorithms, the dose reduction techniques vary based on the specific scanner utilized but frequently include automated exposure control, adjustment of the mA and/or kV according to patient size, and use of iterative reconstruction technique. IV Contrast Media and volume: 80 mL Isovue-370 FINDINGS: DIAGNOSTIC QUALITY: Suboptimal due to patient's large body habitus and inability to hold still. Moderate streak artifact through the chest. PULMONARY EMBOLI: No definite filling defects in the main pulmonary arteries or segmental branches. Subsegmental pulmonary artery branches are not well visualized. RIGHT HEART STRAIN: None. LUNGS AND AIRWAYS: Airways are patent.   Lungs are clear. PLEURA: No pleural effusions or significant pleural thickening. HEART / GREAT VESSELS: Normal heart size. No pericardial effusion. Great vessels are normal caliber. ADENOPATHY: None. CHEST WALL / LOWER NECK: No significant abnormality. UPPER ABDOMEN: Normal. BONES: No destructive process. 1.  No CT findings of pulmonary embolus in the main pulmonary arteries or segmental pulmonary artery branches. Distal branches are difficult to evaluate due to suboptimal contrast opacification and patient motion. 2.  No localized consolidation or pleural effusion. CT ABDOMEN AND PELVIS WITH CONTRAST Comparison study 8/25/2020 HISTORY: Abdominal pain FINDINGS: Thin section axial images obtained through the abdomen and pelvis following the administration of 80 mL Isovue-370. Oral contrast was administered. Multiplanar reconstruction images received for interpretation. Low radiation dose CT technique utilized. The liver, spleen, pancreas, gallbladder, and adrenal glands unremarkable. Bilateral kidneys demonstrate uniform enhancement. No findings for obstructive uropathy. Tiny gas bubble in the right subdiaphragmatic space likely related to recent surgery. There is a previous site gastrectomy. No extravasation of contrast fluid collection seen. Bowel loops are partially opacified without findings for obstruction. No focal bowel wall thickening or mass seen. Appendix is visualized and appears within normal limits. Within the pelvis, bladder mildly distended and otherwise unremarkable. Uterus and adnexal structures are intact. No pelvic lymphadenopathy or free fluid. IMPRESSION: 1. No findings for acute intra-abdominal pelvic abnormality to explain patient's symptoms. 2.  Single tiny small gas bubble in the right subdiaphragmatic space may relate to patient's recent gastric surgery. Correlate clinically. 3.  Nonspecific bowel gas pattern.     Labs:  Results for orders placed or performed during the hospital encounter of 11/25/21   Urinalysis, reflex to microscopic   Result Value Ref Range    Urine Type NotGiven    CBC auto differential   Result Value Ref Range    WBC 6.2 4.0 - 11.0 K/uL    RBC 4.80 4.00 - 5.20 M/uL    Hemoglobin 10.8 (L) 12.0 - 16.0 g/dL    Hematocrit 34.5 (L) 36.0 - 48.0 %    MCV 71.9 (L) 80.0 - 100.0 fL    MCH 22.4 (L) 26.0 - 34.0 pg    MCHC 31.2 31.0 - 36.0 g/dL    RDW 19.5 (H) 12.4 - 15.4 %    Platelets 273 971 - 660 K/uL    MPV 9.1 5.0 - 10.5 fL    Neutrophils % 62.8 %    Lymphocytes % 26.2 %    Monocytes % 8.8 %    Eosinophils % 1.5 %    Basophils % 0.7 %    Neutrophils Absolute 3.9 1.7 - 7.7 K/uL    Lymphocytes Absolute 1.6 1.0 - 5.1 K/uL    Monocytes Absolute 0.6 0.0 - 1.3 K/uL    Eosinophils Absolute 0.1 0.0 - 0.6 K/uL    Basophils Absolute 0.0 0.0 - 0.2 K/uL   Comprehensive Metabolic Panel w/ Reflex to MG   Result Value Ref Range    Sodium 135 (L) 136 - 145 mmol/L    Potassium reflex Magnesium 3.7 3.5 - 5.1 mmol/L    Chloride 99 99 - 110 mmol/L    CO2 25 21 - 32 mmol/L    Anion Gap 11 3 - 16    Glucose 85 70 - 99 mg/dL    BUN 5 (L) 7 - 20 mg/dL    CREATININE 0.7 0.6 - 1.1 mg/dL    GFR Non-African American >60 >60    GFR African American >60 >60    Calcium 9.7 8.3 - 10.6 mg/dL    Total Protein 7.5 6.4 - 8.2 g/dL    Albumin 3.8 3.4 - 5.0 g/dL    Albumin/Globulin Ratio 1.0 (L) 1.1 - 2.2    Total Bilirubin 0.4 0.0 - 1.0 mg/dL    Alkaline Phosphatase 59 40 - 129 U/L    ALT 45 (H) 10 - 40 U/L    AST 23 15 - 37 U/L   Lipase   Result Value Ref Range    Lipase 56.0 13.0 - 60.0 U/L   HCG Qualitative, Serum   Result Value Ref Range    hCG Qual Negative Detects HCG level >10 MIU/mL   D-Dimer, Quantitative   Result Value Ref Range    D-Dimer, Quant 407 (H) 0 - 229 ng/mL DDU       Treatment in the department:  Patient received the following while in the ED.     Medications   iopamidol (ISOVUE-370) 76 % injection 80 mL (has no administration in time range)   HYDROmorphone (DILAUDID) injection 0.5 mg (has no administration in time range)   0.9 % sodium chloride bolus (1,000 mLs IntraVENous New Bag 21)   ondansetron (ZOFRAN) injection 4 mg (4 mg IntraVENous Given 21)   ketorolac (TORADOL) injection 30 mg (30 mg IntraVENous Given 21)   iohexol (OMNIPAQUE 240) injection 50 mL (50 mLs Oral Given 21)   oxyCODONE-acetaminophen (PERCOCET) 5-325 MG per tablet 1 tablet (1 tablet Oral Given 21)         Repeat exam SHOWS  Pain improved. Medical decision makin:07 PM EST  Discussed case with Dr Alycia Holden,  surgery. Agrees with discharge plan. Likely postop residual gas from laparascopy. No fever. No leukocytosis. Benign abdominal exam. No extravasation so unlikely perforation or leak. CTPA no large PE, suboptimal due to body habitus but no shortness of breath. No tachycardia. No hypoxia. Reproducibly tender. Alternative diagnosis likely with right subdiaphragmatic air. I estimate there is LOW risk for ACUTE APPENDICITIS, BOWEL OBSTRUCTION, CHOLECYSTITIS, COMPLICATED DIVERTICULITIS, INCARCERATED HERNIA, PANCREATITIS,  PERFORATED BOWEL or ULCER, ISCHEMIC BOWEL, SURGICAL LEAK, POST OP ABSCESS, PE, PNEUMONIA,  thus I consider the discharge disposition reasonable. Also, there is no evidence or peritonitis, sepsis, or toxicity. Claude Junior and I have discussed the diagnosis and risks, and we agree with discharging home to follow-up with their primary doctor. We also discussed returning to the Emergency Department immediately if new or worsening symptoms occur. Clinical Impression:  1. Postoperative right upper quadrant abdominal pain        Dispo:  Patient will be discharged  at this time. Patient was informed of this decision and agrees with plan. I have discussed lab and xray findings with patient and they understand. Questions were answered to the best of my ability.     Discharge vitals:  Blood pressure (!) 154/89, pulse 78, temperature 97.5 °F (36.4 °C), temperature source Oral, resp. rate 20, height 5' 7\" (1.702 m), weight (!) 440 lb (199.6 kg), last menstrual period 11/02/2021, SpO2 99 %. Prescriptions given:   New Prescriptions    OXYCODONE-ACETAMINOPHEN (PERCOCET) 5-325 MG PER TABLET    Take 1 tablet by mouth every 6 hours as needed for Pain for up to 3 days. Intended supply: 3 days. Take lowest dose possible to manage pain         This chart was created using Dragon voice recognition software.         Javier Diaz MD  11/25/21 2012